# Patient Record
Sex: MALE | Employment: FULL TIME | URBAN - METROPOLITAN AREA
[De-identification: names, ages, dates, MRNs, and addresses within clinical notes are randomized per-mention and may not be internally consistent; named-entity substitution may affect disease eponyms.]

---

## 2019-01-01 ENCOUNTER — HOSPITAL ENCOUNTER (OUTPATIENT)
Dept: DIABETES SERVICES | Age: 59
Discharge: HOME | End: 2019-03-13
Payer: COMMERCIAL

## 2019-01-01 ENCOUNTER — HOSPITAL ENCOUNTER (OUTPATIENT)
Dept: DIABETES SERVICES | Age: 59
Discharge: HOME | End: 2019-04-26
Payer: COMMERCIAL

## 2019-01-01 ENCOUNTER — HOSPITAL ENCOUNTER (OUTPATIENT)
Dept: DIABETES SERVICES | Age: 59
Discharge: HOME | End: 2019-02-27
Attending: FAMILY MEDICINE
Payer: COMMERCIAL

## 2019-01-01 DIAGNOSIS — E11.9 DIABETES MELLITUS WITHOUT COMPLICATION (HCC): ICD-10-CM

## 2019-01-01 PROCEDURE — G0109 DIAB MANAGE TRN IND/GROUP: HCPCS | Performed by: DIETITIAN, REGISTERED

## 2019-01-01 PROCEDURE — G0109 DIAB MANAGE TRN IND/GROUP: HCPCS

## 2019-02-27 NOTE — DIABETES MGMT
2/27/2019 Dear Shanique Mancilla. Andrew Harry MD, Thank you for your kind referral. Your patient, Robert Crook, attended Session #1 at [facility] where the following topics were covered today: 
 
*Describing diabetes disease process and treatment options *Incorporating nutrition management into their lifestyle *Monitoring blood glucose and other parameters and interpreting and using the results for self  
management decision making *Preventing, detecting and treating acute complications *Incorporating physical activity into lifestyle *Using medications safely and for maximum therapeutic effectiveness * Developing personal strategies to promote health and behavior change *Developing personal strategies to address psychosocial issues and concerns Data from first visit: 
Weight: 2/27/2019 237.2# HgbA1c: 2/27/2019 11.7% Increased risk for diabetes: 5.7-6.4%, Diabetes >6.4% Glycemic control for adults with diabetes: < 7% Elderly or multiple medical conditions <8% Random blood glucose: 2/27/2019 Post-Meal 400 mg/dl Meter given: Kadriana Goal(s) set :  
Goal 1: Exercise more often - Walk for 10 minutes 2x/day every day of the week. (total 140 min/week) Your patient will have two (2) additional appointments to complete the ordered education. Their next visit is scheduled for March 12, 2019. We look forward to assisting your patient in meeting their self-management goals. If you have any questions, please do not hesitate to call the [facility] at [phone] Sincerely, Kimberly Heller RD [facility] [address] Phone: [phone] Fax: [phone]

## 2020-01-01 ENCOUNTER — HOSPITAL ENCOUNTER (OUTPATIENT)
Dept: CT IMAGING | Age: 60
Discharge: HOME | End: 2020-01-10
Attending: FAMILY MEDICINE
Payer: COMMERCIAL

## 2020-01-01 ENCOUNTER — HOME CARE VISIT (OUTPATIENT)
Dept: SCHEDULING | Facility: HOME HEALTH | Age: 60
End: 2020-01-01
Payer: COMMERCIAL

## 2020-01-01 ENCOUNTER — HOME CARE VISIT (OUTPATIENT)
Dept: HOSPICE | Facility: HOSPICE | Age: 60
End: 2020-01-01
Payer: COMMERCIAL

## 2020-01-01 ENCOUNTER — HOSPICE ADMISSION (OUTPATIENT)
Dept: HOSPICE | Facility: HOSPICE | Age: 60
End: 2020-01-01
Payer: COMMERCIAL

## 2020-01-01 ENCOUNTER — TELEPHONE (OUTPATIENT)
Dept: PALLATIVE CARE | Age: 60
End: 2020-01-01

## 2020-01-01 ENCOUNTER — OFFICE VISIT (OUTPATIENT)
Dept: PALLATIVE CARE | Age: 60
End: 2020-01-01

## 2020-01-01 ENCOUNTER — NURSE NAVIGATOR (OUTPATIENT)
Dept: PALLATIVE CARE | Age: 60
End: 2020-01-01

## 2020-01-01 VITALS
BODY MASS INDEX: 32.4 KG/M2 | DIASTOLIC BLOOD PRESSURE: 86 MMHG | WEIGHT: 189.8 LBS | HEIGHT: 64 IN | HEART RATE: 125 BPM | SYSTOLIC BLOOD PRESSURE: 123 MMHG | OXYGEN SATURATION: 99 % | RESPIRATION RATE: 18 BRPM | TEMPERATURE: 97.7 F

## 2020-01-01 VITALS
WEIGHT: 189 LBS | HEIGHT: 64 IN | BODY MASS INDEX: 32.27 KG/M2 | SYSTOLIC BLOOD PRESSURE: 104 MMHG | RESPIRATION RATE: 24 BRPM | OXYGEN SATURATION: 93 % | DIASTOLIC BLOOD PRESSURE: 67 MMHG

## 2020-01-01 DIAGNOSIS — C25.9 MALIGNANT NEOPLASM OF PANCREAS, UNSPECIFIED LOCATION OF MALIGNANCY (HCC): Primary | ICD-10-CM

## 2020-01-01 DIAGNOSIS — R10.32 LEFT LOWER QUADRANT PAIN: ICD-10-CM

## 2020-01-01 DIAGNOSIS — K21.00 GASTROESOPHAGEAL REFLUX DISEASE WITH ESOPHAGITIS: ICD-10-CM

## 2020-01-01 DIAGNOSIS — K59.03 DRUG-INDUCED CONSTIPATION: ICD-10-CM

## 2020-01-01 DIAGNOSIS — R10.9 INTRACTABLE ABDOMINAL PAIN: ICD-10-CM

## 2020-01-01 PROCEDURE — A4927 NON-STERILE GLOVES: HCPCS

## 2020-01-01 PROCEDURE — A9270 NON-COVERED ITEM OR SERVICE: HCPCS

## 2020-01-01 PROCEDURE — 74011636320 HC RX REV CODE- 636/320: Performed by: RADIOLOGY

## 2020-01-01 PROCEDURE — HHS10554 SHAMPOO/BODY WASH 8 OZ ALOE VESTA

## 2020-01-01 PROCEDURE — 0651 HSPC ROUTINE HOME CARE

## 2020-01-01 PROCEDURE — A4335 INCONTINENCE SUPPLY: HCPCS

## 2020-01-01 PROCEDURE — T4541 LARGE DISPOSABLE UNDERPAD: HCPCS

## 2020-01-01 PROCEDURE — A6250 SKIN SEAL PROTECT MOISTURIZR: HCPCS

## 2020-01-01 PROCEDURE — G0299 HHS/HOSPICE OF RN EA 15 MIN: HCPCS

## 2020-01-01 PROCEDURE — T4524 ADULT SIZE BRIEF/DIAPER XL: HCPCS

## 2020-01-01 PROCEDURE — 3336500001 HSPC ELECTION

## 2020-01-01 PROCEDURE — HOSPICE MEDICATION HC HH HOSPICE MEDICATION

## 2020-01-01 PROCEDURE — 74011000258 HC RX REV CODE- 258: Performed by: RADIOLOGY

## 2020-01-01 PROCEDURE — 74177 CT ABD & PELVIS W/CONTRAST: CPT

## 2020-01-01 RX ORDER — OXYCODONE HYDROCHLORIDE 15 MG/1
15 TABLET, FILM COATED, EXTENDED RELEASE ORAL EVERY 12 HOURS
Qty: 60 TAB | Refills: 0 | Status: SHIPPED | OUTPATIENT
Start: 2020-01-01 | End: 2020-01-01 | Stop reason: CLARIF

## 2020-01-01 RX ORDER — OXYCODONE HYDROCHLORIDE 15 MG/1
15 TABLET ORAL
Qty: 90 TAB | Refills: 0 | Status: SHIPPED | OUTPATIENT
Start: 2020-01-01 | End: 2020-02-12

## 2020-01-01 RX ORDER — SODIUM CHLORIDE 0.9 % (FLUSH) 0.9 %
10 SYRINGE (ML) INJECTION
Status: COMPLETED | OUTPATIENT
Start: 2020-01-01 | End: 2020-01-01

## 2020-01-01 RX ORDER — FENTANYL 12.5 UG/1
1 PATCH TRANSDERMAL
Qty: 10 PATCH | Refills: 0 | Status: SHIPPED | OUTPATIENT
Start: 2020-01-01 | End: 2020-02-28

## 2020-01-01 RX ORDER — LACTULOSE 10 G/15ML
30 SOLUTION ORAL; RECTAL 3 TIMES DAILY
Qty: 480 ML | Refills: 1 | Status: SHIPPED | OUTPATIENT
Start: 2020-01-01

## 2020-01-01 RX ORDER — LIDOCAINE AND PRILOCAINE 25; 25 MG/G; MG/G
CREAM TOPICAL AS NEEDED
Qty: 30 G | Refills: 0 | Status: SHIPPED | OUTPATIENT
Start: 2020-01-01

## 2020-01-01 RX ORDER — NALOXONE HYDROCHLORIDE 4 MG/.1ML
SPRAY NASAL
Qty: 2 EACH | Refills: 0 | Status: SHIPPED | OUTPATIENT
Start: 2020-01-01

## 2020-01-01 RX ORDER — PANTOPRAZOLE SODIUM 40 MG/1
40 TABLET, DELAYED RELEASE ORAL DAILY
Qty: 30 TAB | Refills: 0 | Status: SHIPPED | OUTPATIENT
Start: 2020-01-01

## 2020-01-01 RX ADMIN — IOPAMIDOL 100 ML: 755 INJECTION, SOLUTION INTRAVENOUS at 13:05

## 2020-01-01 RX ADMIN — SODIUM CHLORIDE 100 ML: 900 INJECTION, SOLUTION INTRAVENOUS at 13:05

## 2020-01-01 RX ADMIN — Medication 10 ML: at 13:05

## 2020-01-27 NOTE — PROGRESS NOTES
[facility] Palliative Medicine Office  Nursing Note  [phone]  Fax [phone]      Name:  Kt Gaming  YOB: 1960    Received outpatient Palliative Medicine referral from [facility] to see pt for symptom management and supportive care. Chart  reviewed. Kt Gaming is a 60 y.o. male with stage 4 pancreatic cancer. Patient will be seen at [facility] tomorrow. Past records will be obtained prior to appt for PMH. ACP:     NOT ON FILE                                                                                                                                                      Nurse called pt and introduced Palliative Medicine services. Wife states they initially thought the issues patient was having were GI related. However, they found out he has stage 4 pancreatic cancer with mets to his liver and abdominal lining. He is having a paracentesis today followed by a portacath placement. He has not been eating much but she hopes he will eat a little more after the procedures today with less fluid in his abdomen. Wife seems overwhelmed and needs reassurance they are doing everything they are supposed to be doing. They will meet with oncologist tomorrow as well and she hopes this will bring a plan for when to begin chemo. He has prescriptions for valium and oxycontin but wife is nervous to give them because of the warning labels regarding respiratory problems. She states patient already has trouble breathing.  Scheduled patient to be seen tomorrow 1/28/2020 at [facility] with Dr. Lars Reardon

## 2020-01-27 NOTE — TELEPHONE ENCOUNTER
Murali with I states she has faxed records to us at [phone] and [phone]. . Advised her I would call back tomorrow 1/28/20 and request if we never receive them.

## 2020-01-28 NOTE — PROGRESS NOTES
Palliative Medicine Outpatient Services Timothy: [phone]) Patient Name: Raven Dunbar YOB: 1960 Date of Current Visit: 01/28/20 Location of Current Visit:   
[x] [facility] Office 
[] [facility] Office [] [address] [place] [address] 
[] Home 
[] Other:   
 
Date of Initial Visit: 1/28/2020 Referral from: Self Primary Care Physician: Esequiel Low MD 
  
 SUMMARY:  
Raven Dunbar is a 60y.o. year old with a  history of diabetes, newly diagnosed metastatic pancreatic cancer with mets to liver and peritoneal carcinomatosis with malignant ascites who was referred to Palliative Medicine by self for management of pain, symptoms and psychosocial support. 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 John Mendoza has 2 daughters from a previous marriage. Patient's oncologist is Dr. Melissa Claudio with [facility]-about to start chemotherapy with FOLFOX. Has had 2 ascitic tap's 10 days apart each with over 7 L removed. Initial Referral Intake note reviewed PALLIATIVE DIAGNOSES:  
 
  ICD-10-CM ICD-9-CM 1. Malignant neoplasm of pancreas, unspecified location of malignancy (HCC) C25.9 157.9 oxyCODONE IR (OXY-IR) 15 mg immediate release tablet  
   oxyCODONE ER (OXYCONTIN) 15 mg ER tablet CT GUIDED CELIAC BLOCK 2. Drug-induced constipation K59.03 564.09 lactulose (CHRONULAC) 10 gram/15 mL solution E980.5 3. Gastroesophageal reflux disease with esophagitis K21.0 530.11 pantoprazole (PROTONIX) 40 mg tablet PLAN:  
 
Patient Instructions Dear Raven Dunbar , It was a pleasure seeing you today at [facility] office We will see you again in next week If labs or imaging tests have been ordered for you today, please call the office  at [phone] 48 hours after completion to obtain the results.    
 
 
This is the plan we talked about: 
 
 1.  Severe abdominal pain 
-Your abdominal pain is from the cancer and abdominal distention. 
-Please discuss with Dr. Nilsa Drummond about an [address] catheter which is a permanent catheter in the abdomen to remove fluid instead of as needed paracentesis. He has already had 2 paracentesis with a significant amount of fluid removal. 
-You are taking 2 tabs of Percocet along with Valium for pain control and still struggles with severe pain. 
-I do not think Valium is a good pain medication at this time and therefore you should stop it. 
-Stop Valium 
-Start oxycodone 15 mg every 4 hours as needed. Please do not let him go more than 6 hours without oxycodone 
-Start OxyContin 15 mg 2 times a day-this is a long-acting medicine which will help with his overall pain control. 
-I also think you will benefit from a nerve block, we will place a referral for a celiac nerve block. 2.  Constipation 
-You are struggling with severe constipation. 
-All opioids put you at risk of constipation. 
-Start stool softener 2 tablets 2 times a day, MiraLAX every day. -You have not had a bowel movement in several days and therefore I am prescribing lactulose 30 g to take 3 times a day until you have a bowel movement and then stop. 3.  You and your wife have good insight about your cancer. You had a port placed and you are meeting with Dr. Nilsa Drummond today to discuss the start date for chemotherapy. 4.  Start Protonix 40 mg daily in the morning for heartburn. 5.  You are not eating anything at this time. Let us work on optimal pain control and constipation management before initiating medicine to help with appetite. 
-We will be in touch with you tomorrow to make sure this regimen is working well for you. This is what you have shared with us about Advance Care Planning: 
 
  Primary Decision Maker: Haider Ogden - Spouse - [phone] Advance Care Planning 1/28/2020 Patient's Healthcare Decision Maker is: Verbal statement (Legal Next of Kin remains as decision maker) Confirm Advance Directive None Patient Would Like to Complete Advance Directive Yes The Palliative Medicine Team is here to support you and your family. Sincerely, 
 
 
Tamra Stone MD and the Palliative Medicine Team 
 
 
 GOALS OF CARE / TREATMENT PREFERENCES:  
[====Goals of Care====] GOALS OF CARE: 
Patient / health care proxy stated goals: See Patient Instructions / Summary TREATMENT PREFERENCES:  
Code Status:  [x] Attempt Resuscitation       [] Do Not Attempt Resuscitation Advance Care Planning: 
[x] The [facility] Interdisciplinary Team has updated the ACP Navigator with Decision Maker and Patient Capacity Primary Decision Maker: Lino Hamilton - [phone] Advance Care Planning 1/28/2020 Patient's Healthcare Decision Maker is: Verbal statement (Legal Next of Kin remains as decision maker) Confirm Advance Directive None Patient Would Like to Complete Advance Directive Yes Other: 
(If patient appropriate for POST, consider using PALLPOST smart phrase here) The palliative care team has discussed with patient / health care proxy about goals of care / treatment preferences for patient. 
[====Goals of Care====] PRESCRIPTIONS GIVEN:  
 
Medications Ordered Today Medications  oxyCODONE IR (OXY-IR) 15 mg immediate release tablet Sig: Take 1 Tab by mouth every four (4) hours as needed for Pain for up to 15 days. Max Daily Amount: 90 mg. Dispense:  90 Tab Refill:  0  
 oxyCODONE ER (OXYCONTIN) 15 mg ER tablet Sig: Take 1 Tab by mouth every twelve (12) hours for 30 days. Max Daily Amount: 30 mg. Dispense:  60 Tab Refill:  0  
 naloxone (NARCAN) 4 mg/actuation nasal spray Sig: Use 1 spray intranasally, then discard. Repeat with new spray every 2 min as needed for opioid overdose symptoms, alternating nostrils. Dispense:  2 Each Refill:  0  
 lactulose (CHRONULAC) 10 gram/15 mL solution Sig: Take 45 mL by mouth three (3) times daily. Dispense:  480 mL Refill:  1  
 pantoprazole (PROTONIX) 40 mg tablet Sig: Take 1 Tab by mouth daily. Dispense:  30 Tab Refill:  0  
 lidocaine-prilocaine (EMLA) topical cream  
  Sig: Apply  to affected area as needed for Pain. Dispense:  30 g Refill:  0  
  
 
 
 FOLLOW UP: No future appointments. PHYSICIANS INVOLVED IN CARE:  
Patient Care Team: 
Barbara Gonzáles MD as PCP - [facility]) Tristan Mcmillan MD as Physician (Hematology and Oncology) HISTORY:  
Reviewed patient-completed ESAS and advance care planning form. Reviewed patient record in prescription monitoring program. 
 
CHIEF COMPLAINT: No chief complaint on file. HPI/SUBJECTIVE: The patient is: [x] Verbal / [] Nonverbal  
 
Patient seen today along with his wife. He appears very tired and drowsy. He has been taking 2 tablets of Percocet and Valium around-the-clock with uncontrolled pain. Patient and wife are overwhelmed unclear about medication management and neck steps. He had a port placed and having significant pain around the area. Significant constipation, no bowel movement in a week. Has not been eating or drinking anything. Clinical Pain Assessment (nonverbal scale for nonverbal patients):  
[++++ Clinical Pain Assessment++++] [++++Pain Severity++++]: Pain: 5 
[++++Pain Character++++]: throbbing, cramping 
[++++Pain Duration++++]: weeks [++++Pain Effect++++]: functional and emotional 
[++++Pain Factors++++]: none in particular 
[++++Pain Frequency++++]: constant [++++Pain Location++++]: entire abdomen 
[++++ Clinical Pain Assessment++++] FUNCTIONAL ASSESSMENT:  
 
Palliative Performance Scale (PPS): PPS: 70 PSYCHOSOCIAL/SPIRITUAL SCREENING:  
 
Any spiritual / Sabianist concerns: 
[] Yes /  [x] No 
 
Caregiver Burnout: 
[] Yes /  [x] No /  [] No Caregiver Present Anticipatory grief assessment: [x] Normal  / [] Maladaptive ESAS Anxiety: Anxiety: 5 ESAS Depression: Depression: 4 REVIEW OF SYSTEMS:  
 
The following systems were [x] reviewed / [] unable to be reviewed Systems: constitutional, ears/nose/mouth/throat, respiratory, gastrointestinal, genitourinary, musculoskeletal, integumentary, neurologic, psychiatric, endocrine. Positive findings noted below. Modified ESAS Completed by: provider Fatigue: 8 Drowsiness: 8 Depression: 4 Pain: 5 Anxiety: 5 Nausea: 3 Anorexia: 8 Dyspnea: 5 Best Well-Bein Constipation: Yes Other Problem (Comment): 0 PHYSICAL EXAM:  
 
Wt Readings from Last 3 Encounters:  
20 189 lb 12.8 oz (86.1 kg) Blood pressure 123/86, pulse (!) 125, temperature 97.7 °F (36.5 °C), temperature source Oral, resp. rate 18, height 5' 4\" (1.626 m), weight 189 lb 12.8 oz (86.1 kg), SpO2 99 %. Last bowel movement: See Nursing Note Constitutional: Alert, oriented but very weak Eyes: pupils equal, anicteric ENMT: no nasal discharge, moist mucous membranes Cardiovascular: regular rhythm, distal pulses intact Respiratory: breathing not labored, symmetric Gastrointestinal:  distended, no tenderness on palpation but significant discomfort with palpation Musculoskeletal: no deformity, no tenderness to palpation Skin: warm, dry Neurologic: following commands, moving all extremities Psychiatric: full affect, no hallucinations Other: 
 
 
 HISTORY:  
 
No past medical history on file. No past surgical history on file. No family history on file. History reviewed, no pertinent family history. Social History Tobacco Use  Smoking status: Not on file Substance Use Topics  Alcohol use: Not on file Allergies not on file Current Outpatient Medications Medication Sig  
 oxyCODONE IR (OXY-IR) 15 mg immediate release tablet Take 1 Tab by mouth every four (4) hours as needed for Pain for up to 15 days. Max Daily Amount: 90 mg.  oxyCODONE ER (OXYCONTIN) 15 mg ER tablet Take 1 Tab by mouth every twelve (12) hours for 30 days. Max Daily Amount: 30 mg.  
 naloxone (NARCAN) 4 mg/actuation nasal spray Use 1 spray intranasally, then discard. Repeat with new spray every 2 min as needed for opioid overdose symptoms, alternating nostrils.  lactulose (CHRONULAC) 10 gram/15 mL solution Take 45 mL by mouth three (3) times daily.  pantoprazole (PROTONIX) 40 mg tablet Take 1 Tab by mouth daily.  lidocaine-prilocaine (EMLA) topical cream Apply  to affected area as needed for Pain. No current facility-administered medications for this visit. LAB DATA REVIEWED:  
 
No results found for: WBC, HGB, PLT, HGBEXT, PLTEXT No results found for: NA, K, CL, CO2, BUN, CREA, CA, MG, PHOS No results found for: SGOT, GPT, AP, TBIL, TP, ALB, GLOB, GGT No results found for: INR, PTMR, PTP, PT1, PT2, APTT, INREXT No results found for: IRON, FE, TIBC, IBCT, PSAT, FERR CONTROLLED SUBSTANCES SAFETY ASSESSMENT (IF ON CONTROLLED SUBSTANCES):  
 
Reviewed opioid safety handout:  [x] Yes   [] No 
24 hour opioid dose >150mg morphine equivalent/day:  [] Yes   [x] No 
Benzodiazepines:  [x] Yes   [] No 
Sleep apnea:  [] Yes   [x] No 
Urine Toxicology Testing within last 6 months:  [] Yes   [x] No 
History of or new aberrant medication taking behaviors:  [] Yes   [] No 
Has Narcan been prescribed [x] Yes   [] No 
 
   
 
Total time: 70 minutes Counseling / coordination time:  
> 50% counseling / coordination?:

## 2020-01-28 NOTE — PROGRESS NOTES
Palliative Medicine Office Visit Palliative Medicine Nurse Check In 
[phone] Patient Name: Iker Garcia YOB: 1960 Date of Office Visit: 1/28/2020 Patient states: \"  \" 
 
From Check In Sheet (scanned in Media): 
Has a medical provider talked with you about cardiopulmonary resuscitation (CPR)? [x] Yes   [] No   [] Unable to obtain Nurse reminder to complete or update ACP FlowSheet: 
 
Is ACP on the Problem List?    [] Yes    [x] No 
IF ACP Document is ON FILE; Nurse to place ACP on Problem List  
 
Is there an ACP Note in Chart Review/Note? [] Yes    [x] No  
If NO: ALERT PROVIDER Primary Decision Maker: Dustin Nice [facility] - [phone] Advance Care Planning 1/28/2020 Patient's Healthcare Decision Maker is: Verbal statement (Legal Next of Kin remains as decision maker) Confirm Advance Directive None Patient Would Like to Complete Advance Directive Yes Is there anything that we should know about you as a person in order to provide you the best care possible? Have you been to the ER, urgent care clinic since your last visit? [] Yes   [x] No   [] Unable to obtain Have you been hospitalized since your last visit? [] Yes   [x] No   [] Unable to obtain Have you seen or consulted any other health care providers outside of the [address] [place] [address] since your last visit? [] Yes   [x] No   [] Unable to obtain Functional status (describe):  
 
 
  
Last BM: 1/20/2020  accessed (date): 1/28/2020 Bottle review (for opioid pain medication): 
Medication 1:  
Date filled:  
Directions:  
# filled: # left: # pills taking per day: 
Last dose taken: 
 
Medication 2:  
Date filled:  
Directions:  
# filled: # left: # pills taking per day: 
Last dose taken: 
 
Medication 3:  
Date filled:  
Directions:  
# filled: # left: # pills taking per day: 
Last dose taken: 
 
Medication 4:  
Date filled:  
Directions:  
# filled: # left: # pills taking per day: 
Last dose taken:

## 2020-01-28 NOTE — PATIENT INSTRUCTIONS
Dear Stuart Bradshaw , It was a pleasure seeing you today at [facility] office We will see you again in next week If labs or imaging tests have been ordered for you today, please call the office  at [phone] 48 hours after completion to obtain the results. This is the plan we talked about: 1. Severe abdominal pain 
-Your abdominal pain is from the cancer and abdominal distention. 
-Please discuss with Dr. Joelene Essex about an [address] catheter which is a permanent catheter in the abdomen to remove fluid instead of as needed paracentesis. He has already had 2 paracentesis with a significant amount of fluid removal. 
-You are taking 2 tabs of Percocet along with Valium for pain control and still struggles with severe pain. 
-I do not think Valium is a good pain medication at this time and therefore you should stop it. 
-Stop Valium 
-Start oxycodone 15 mg every 4 hours as needed. Please do not let him go more than 6 hours without oxycodone 
-Start OxyContin 15 mg 2 times a day-this is a long-acting medicine which will help with his overall pain control. 
-I also think you will benefit from a nerve block, we will place a referral for a celiac nerve block. 2.  Constipation 
-You are struggling with severe constipation. 
-All opioids put you at risk of constipation. 
-Start stool softener 2 tablets 2 times a day, MiraLAX every day. -You have not had a bowel movement in several days and therefore I am prescribing lactulose 30 g to take 3 times a day until you have a bowel movement and then stop. 3.  You and your wife have good insight about your cancer. You had a port placed and you are meeting with Dr. Joelene Essex today to discuss the start date for chemotherapy. 4.  Start Protonix 40 mg daily in the morning for heartburn. 5.  You are not eating anything at this time. Let us work on optimal pain control and constipation management before initiating medicine to help with appetite. -We will be in touch with you tomorrow to make sure this regimen is working well for you. This is what you have shared with us about Advance Care Planning: 
 
  Primary Decision Maker: Dimas Fisher - Spouse - [phone] Advance Care Planning 1/28/2020 Patient's Healthcare Decision Maker is: Verbal statement (Legal Next of Kin remains as decision maker) Confirm Advance Directive None Patient Would Like to Complete Advance Directive Yes The Palliative Medicine Team is here to support you and your family.   
 
 
Sincerely, 
 
 
Andreina Melissa MD and the Palliative Medicine Team

## 2020-01-29 NOTE — TELEPHONE ENCOUNTER
Herb Rojas called from [address] [place] [address]. He doesn't schedule patient's for CT. He is the technician that does the CT. He needs us to call central scheduling in order to get patient scheduled. Otherwise he will not be on the schedule. Please call central scheduling at [phone].

## 2020-01-29 NOTE — TELEPHONE ENCOUNTER
Patients insurance does not cover OxyContin, please sign order for Fentanyl 12 mcg transdermal every 72 hours

## 2020-01-30 NOTE — TELEPHONE ENCOUNTER
Patient was to get first chemo today, patient was uncomfortable through the night and disoriented , [facility] reports she did not think much of it and thought he may just needed fluids, patient currently admitted to [facility] no chemo today   nephrology was in to evaluate currently receiving a madsen, currently receiving fluids, wife tearful because she believes his liver is shutting down, wife aware that Dr Gregorio Mackey can not give orders while he is admitted to [facility] , however she would be happy to discuss Pallaitive options with him for patients pain/palliative needs  while admitted.  Wife tearful stating she \"does not want to start over\" , [facility] [place] [facility] at hospital with her daughter an patients brother and appreciative of our help and will keep our office updated on changes

## 2020-01-30 NOTE — TELEPHONE ENCOUNTER
Franck Villarreal is calling to speak to nurse. States that patient is now in [facility] EMERGENCY [facility]. She is asking questions regarding getting help for patient at [facility] while he is in patient. Advised nurse would call her back to discuss.

## 2020-01-31 NOTE — TELEPHONE ENCOUNTER
[facility]  Palliative Medicine  Home Based Primary Care  Social Work  Telephone Call      Note:    Called patient's spouse, John eMndoza to introduce self and this writer's role on the PM team. Informed patient's wife of the ability to provide supportive counseling. Patient's wife expressed appreciation and reported that patient is currently in the hospital and that she just got home for a moment and is heading right back to the hospital. Patient's wife also reported that Dr. Juvenal Valdes and the family are having a family meeting this afternoon to discuss prognosis/plan. Patient's wife reported that patient is no longer a candidate for chemotherapy. Patient's wife became tearful. Provided emotional support and reflective listening. Plan:  Patient's wife reported she would reach out to writer and/or PM team if needed. Expressed appreciation for the call.      Length of Call: 15 minutes      Thien Hillman [place]   Palliative Medicine,    [phone]

## 2020-02-13 ENCOUNTER — HOME CARE VISIT (OUTPATIENT)
Dept: HOSPICE | Facility: HOSPICE | Age: 60
End: 2020-02-13
Payer: COMMERCIAL